# Patient Record
Sex: FEMALE | Race: WHITE | Employment: UNEMPLOYED | URBAN - METROPOLITAN AREA
[De-identification: names, ages, dates, MRNs, and addresses within clinical notes are randomized per-mention and may not be internally consistent; named-entity substitution may affect disease eponyms.]

---

## 2021-11-03 ENCOUNTER — OFFICE VISIT (OUTPATIENT)
Dept: FAMILY MEDICINE CLINIC | Facility: CLINIC | Age: 41
End: 2021-11-03
Payer: COMMERCIAL

## 2021-11-03 ENCOUNTER — LAB ENCOUNTER (OUTPATIENT)
Dept: LAB | Age: 41
End: 2021-11-03
Attending: FAMILY MEDICINE
Payer: COMMERCIAL

## 2021-11-03 VITALS
DIASTOLIC BLOOD PRESSURE: 72 MMHG | OXYGEN SATURATION: 97 % | TEMPERATURE: 98 F | HEART RATE: 72 BPM | RESPIRATION RATE: 16 BRPM | BODY MASS INDEX: 28.28 KG/M2 | WEIGHT: 176 LBS | SYSTOLIC BLOOD PRESSURE: 122 MMHG | HEIGHT: 66 IN

## 2021-11-03 DIAGNOSIS — Z00.00 LABORATORY EXAM ORDERED AS PART OF ROUTINE GENERAL MEDICAL EXAMINATION: ICD-10-CM

## 2021-11-03 DIAGNOSIS — Z23 NEED FOR VACCINATION: ICD-10-CM

## 2021-11-03 DIAGNOSIS — N64.4 BREAST PAIN IN FEMALE: ICD-10-CM

## 2021-11-03 DIAGNOSIS — Z12.4 CERVICAL CANCER SCREENING: ICD-10-CM

## 2021-11-03 DIAGNOSIS — Z80.3 FAMILY HISTORY OF BREAST CANCER: ICD-10-CM

## 2021-11-03 DIAGNOSIS — N64.52 DISCHARGE FROM LEFT NIPPLE: Primary | ICD-10-CM

## 2021-11-03 PROBLEM — F41.9 ANXIETY: Status: ACTIVE | Noted: 2018-03-27

## 2021-11-03 PROBLEM — G25.81 RESTLESS LEG SYNDROME: Status: ACTIVE | Noted: 2018-03-27

## 2021-11-03 PROBLEM — G89.29 CHRONIC BILATERAL LOW BACK PAIN WITH BILATERAL SCIATICA: Status: ACTIVE | Noted: 2018-06-28

## 2021-11-03 PROBLEM — N87.0 CIN I (CERVICAL INTRAEPITHELIAL NEOPLASIA I): Status: ACTIVE | Noted: 2017-05-30

## 2021-11-03 PROBLEM — M54.42 CHRONIC BILATERAL LOW BACK PAIN WITH BILATERAL SCIATICA: Status: ACTIVE | Noted: 2018-06-28

## 2021-11-03 PROBLEM — M54.41 CHRONIC BILATERAL LOW BACK PAIN WITH BILATERAL SCIATICA: Status: ACTIVE | Noted: 2018-06-28

## 2021-11-03 PROBLEM — F17.200 NICOTINE DEPENDENCE: Status: ACTIVE | Noted: 2018-03-27

## 2021-11-03 PROCEDURE — 90686 IIV4 VACC NO PRSV 0.5 ML IM: CPT | Performed by: FAMILY MEDICINE

## 2021-11-03 PROCEDURE — 3008F BODY MASS INDEX DOCD: CPT | Performed by: FAMILY MEDICINE

## 2021-11-03 PROCEDURE — 99204 OFFICE O/P NEW MOD 45 MIN: CPT | Performed by: FAMILY MEDICINE

## 2021-11-03 PROCEDURE — 3078F DIAST BP <80 MM HG: CPT | Performed by: FAMILY MEDICINE

## 2021-11-03 PROCEDURE — 90471 IMMUNIZATION ADMIN: CPT | Performed by: FAMILY MEDICINE

## 2021-11-03 PROCEDURE — 3074F SYST BP LT 130 MM HG: CPT | Performed by: FAMILY MEDICINE

## 2021-11-03 RX ORDER — MULTIVITAMIN
1 TABLET ORAL DAILY
COMMUNITY

## 2021-11-03 NOTE — PROGRESS NOTES
Patient presents with:  New Patient: wants to discuss her hx of HPV, nipple discharge & left low back pain. Had mammo & MRI of both breast done 9/15/20, results available in Care Everywhere.   Immunization/Injection: Influenza vaccine today      H[facility]:  [facility]    • Restless leg syndrome 3/27/2018     Past Surgical History:   Procedure Laterality Date   • BREAST BIOPSY  2020    benign cyst per pt   •   2018    [facility] [place] [facility]   • COLPOSCOPY,BX CERVIX/ENDOCERV CURR  2017 : Not on file  Social Connections:       Frequency of Communication with Friends and Family: Not on file      Frequency of Social Gatherings with Friends and Family: Not on file      Attends Restoration Services: Not on file      Active Member of [employer] or Or clear to auscultation bilaterally. No wheezes, rales, or rhonchi. Normal respiratory effort. CARDIOVASCULAR:  Regular rate and rhythm. No murmurs, gallops, or rubs. ABDOMEN:  + bowel sounds, soft, nontender, nondistended. No hepatomegaly.   Contreras Chin

## 2021-11-05 ENCOUNTER — HOSPITAL ENCOUNTER (OUTPATIENT)
Dept: MAMMOGRAPHY | Age: 41
Discharge: HOME | End: 2021-11-05
Attending: FAMILY MEDICINE
Payer: COMMERCIAL

## 2021-11-05 DIAGNOSIS — N64.52 DISCHARGE FROM LEFT NIPPLE: ICD-10-CM

## 2021-11-05 DIAGNOSIS — N64.4 BREAST PAIN IN FEMALE: ICD-10-CM

## 2021-11-05 PROCEDURE — 77066 DX MAMMO INCL CAD BI: CPT | Performed by: FAMILY MEDICINE

## 2021-11-05 PROCEDURE — 77062 BREAST TOMOSYNTHESIS BI: CPT | Performed by: FAMILY MEDICINE

## 2021-11-15 ENCOUNTER — TELEPHONE (OUTPATIENT)
Dept: FAMILY MEDICINE CLINIC | Facility: CLINIC | Age: 41
End: 2021-11-15

## 2021-11-15 NOTE — TELEPHONE ENCOUNTER
Pt states that she has requested her records from [place] to be sent to us. she wonders if we have received them? Imaging done in hospital outpatient building, The radiologist wanted to see the previous scans to what was just done.  She wonders if theres

## 2021-11-19 ENCOUNTER — OFFICE VISIT (OUTPATIENT)
Dept: FAMILY MEDICINE CLINIC | Facility: CLINIC | Age: 41
End: 2021-11-19
Payer: COMMERCIAL

## 2021-11-19 VITALS
DIASTOLIC BLOOD PRESSURE: 72 MMHG | WEIGHT: 173 LBS | BODY MASS INDEX: 27.8 KG/M2 | HEIGHT: 66 IN | HEART RATE: 68 BPM | TEMPERATURE: 98 F | SYSTOLIC BLOOD PRESSURE: 120 MMHG | OXYGEN SATURATION: 97 % | RESPIRATION RATE: 16 BRPM

## 2021-11-19 DIAGNOSIS — Z87.11 HISTORY OF PEPTIC ULCER DISEASE: ICD-10-CM

## 2021-11-19 DIAGNOSIS — R07.89 CHEST DISCOMFORT: ICD-10-CM

## 2021-11-19 DIAGNOSIS — R19.7 DIARRHEA, UNSPECIFIED TYPE: Primary | ICD-10-CM

## 2021-11-19 DIAGNOSIS — R11.0 NAUSEA: ICD-10-CM

## 2021-11-19 DIAGNOSIS — N64.52 DISCHARGE FROM LEFT NIPPLE: ICD-10-CM

## 2021-11-19 DIAGNOSIS — E78.5 DYSLIPIDEMIA: ICD-10-CM

## 2021-11-19 PROCEDURE — 3008F BODY MASS INDEX DOCD: CPT | Performed by: FAMILY MEDICINE

## 2021-11-19 PROCEDURE — 3078F DIAST BP <80 MM HG: CPT | Performed by: FAMILY MEDICINE

## 2021-11-19 PROCEDURE — 3074F SYST BP LT 130 MM HG: CPT | Performed by: FAMILY MEDICINE

## 2021-11-19 PROCEDURE — 99214 OFFICE O/P EST MOD 30 MIN: CPT | Performed by: FAMILY MEDICINE

## 2021-11-19 RX ORDER — DICYCLOMINE HYDROCHLORIDE 10 MG/1
10 CAPSULE ORAL
Qty: 60 CAPSULE | Refills: 0 | Status: SHIPPED | OUTPATIENT
Start: 2021-11-19

## 2021-11-19 NOTE — PROGRESS NOTES
[facility] Visit Note  11/19/2021      Subjective:      Patient ID: Jules Perkins is a 46year old female. Chief Complaint:  Patient presents with:   Follow - Up: 2 week f/u on back pain  Lab Results: done 11/3/21      HPI:  Bebeto Durham sleeping      Denies- n/v, melena, hematochezia      Nausea all the time/diarrhea- stooling issues going on x 1 yr. Gets diarrhea 4AM that wakes her up with cramping and then is up every hour until 7:00AM. Is tired of waking up feeling sick all the time.  Winnie Herman Dyslipidemia  -work on diet/exercise. Return in about 2 weeks (around 12/3/2021) for f/u chest discomfort and GI concerns.

## 2021-11-22 ENCOUNTER — EKG ENCOUNTER (OUTPATIENT)
Dept: LAB | Age: 41
End: 2021-11-22
Attending: FAMILY MEDICINE
Payer: COMMERCIAL

## 2021-11-22 DIAGNOSIS — R07.89 CHEST DISCOMFORT: ICD-10-CM

## 2021-11-22 PROCEDURE — 93005 ELECTROCARDIOGRAM TRACING: CPT

## 2021-11-22 PROCEDURE — 93010 ELECTROCARDIOGRAM REPORT: CPT | Performed by: INTERNAL MEDICINE

## 2021-11-23 ENCOUNTER — PATIENT MESSAGE (OUTPATIENT)
Dept: FAMILY MEDICINE CLINIC | Facility: CLINIC | Age: 41
End: 2021-11-23

## 2021-11-23 NOTE — TELEPHONE ENCOUNTER
From: Servando Dozier  To: Darrius Izaguirre MD  Sent: 11/23/2021 12:05 PM CST  Subject: Ekg and Mammogram     Adrienne:  I had me EKG test done yesterday and she stated that she would sent the results to you. Could you please contact me with those.

## 2021-11-24 ENCOUNTER — PATIENT MESSAGE (OUTPATIENT)
Dept: FAMILY MEDICINE CLINIC | Facility: CLINIC | Age: 41
End: 2021-11-24

## 2021-11-24 NOTE — TELEPHONE ENCOUNTER
From: Servando Dozier  To: Darrius Izaguirre MD  Sent: 11/24/2021 10:11 AM CST  Subject: Mammogram     Could someone please have my doctor call me with my test results from my Mammogram on November 5th.  I would like to know what the radiologist is

## 2021-12-08 ENCOUNTER — HOSPITAL ENCOUNTER (OUTPATIENT)
Dept: ULTRASOUND IMAGING | Age: 41
Discharge: HOME | End: 2021-12-08
Attending: FAMILY MEDICINE
Payer: COMMERCIAL

## 2021-12-08 ENCOUNTER — HOSPITAL ENCOUNTER (OUTPATIENT)
Dept: MAMMOGRAPHY | Age: 41
Discharge: HOME | End: 2021-12-08
Attending: FAMILY MEDICINE
Payer: COMMERCIAL

## 2021-12-08 DIAGNOSIS — N64.52 DISCHARGE FROM LEFT NIPPLE: ICD-10-CM

## 2021-12-08 DIAGNOSIS — N64.4 BREAST PAIN IN FEMALE: ICD-10-CM

## 2021-12-08 DIAGNOSIS — R92.2 INCONCLUSIVE MAMMOGRAM: ICD-10-CM

## 2021-12-08 PROCEDURE — 76642 ULTRASOUND BREAST LIMITED: CPT | Performed by: FAMILY MEDICINE

## 2021-12-08 PROCEDURE — 77061 BREAST TOMOSYNTHESIS UNI: CPT | Performed by: FAMILY MEDICINE

## 2021-12-08 PROCEDURE — 77065 DX MAMMO INCL CAD UNI: CPT | Performed by: FAMILY MEDICINE

## 2021-12-08 PROCEDURE — 76641 ULTRASOUND BREAST COMPLETE: CPT | Performed by: FAMILY MEDICINE

## 2021-12-08 NOTE — IMAGING NOTE
Ileana Day is recommended for an ultrasound guided biopsy of the right breast x 1 site and left breast x 1 site by [facility].      History obtained:  INDICATIONS: Inconclusive mammogram.  The patient reports a history of left milky and clear nipple di cm.       At 11:30 3 cm from the nipple there is a probably benign complicated cyst measuring 0.3 x 0.2 x 0.2 cm.       There is no right axillary lymphadenopathy.       Left breast ultrasound:   Targeted left breast ultrasound was performed by a trained s 0   • FLUoxetine 20 MG Oral Cap Take 1 capsule by mouth daily. Authorized by: Kadi Jose     • Daily Multiple Vitamins Oral Tab Take 1 tablet by mouth daily. Procedure explained and questions answered.    Ileana Day  provided with written

## 2021-12-22 ENCOUNTER — TELEPHONE (OUTPATIENT)
Dept: FAMILY MEDICINE CLINIC | Facility: CLINIC | Age: 41
End: 2021-12-22

## 2021-12-22 NOTE — TELEPHONE ENCOUNTER
We cannot do a video visit for UTI, she will need to go to [facility], but she wait if no symptoms

## 2021-12-22 NOTE — TELEPHONE ENCOUNTER
Pt scheduled an appt via [employer] for video visit for UTI symptoms. I sent message to the patient to see if she can come into the office. She responded  \"If she doesn't think it is an emergency and can wait until after the weekend holidays?  If this is po

## 2021-12-23 ENCOUNTER — HOSPITAL ENCOUNTER (OUTPATIENT)
Dept: MAMMOGRAPHY | Facility: HOSPITAL | Age: 41
Discharge: HOME | End: 2021-12-23
Attending: FAMILY MEDICINE
Payer: COMMERCIAL

## 2021-12-23 DIAGNOSIS — N63.0 BREAST NODULE: ICD-10-CM

## 2021-12-23 PROCEDURE — 19083 BX BREAST 1ST LESION US IMAG: CPT | Performed by: FAMILY MEDICINE

## 2021-12-23 PROCEDURE — 77066 DX MAMMO INCL CAD BI: CPT | Performed by: FAMILY MEDICINE

## 2021-12-23 PROCEDURE — 19084 BX BREAST ADD LESION US IMAG: CPT | Performed by: FAMILY MEDICINE

## 2021-12-23 PROCEDURE — 88305 TISSUE EXAM BY PATHOLOGIST: CPT | Performed by: FAMILY MEDICINE

## 2021-12-28 ENCOUNTER — TELEPHONE (OUTPATIENT)
Dept: MAMMOGRAPHY | Facility: HOSPITAL | Age: 41
End: 2021-12-28

## 2021-12-28 NOTE — TELEPHONE ENCOUNTER
Telephoned Ileana Rachel and name,  verified with patient.  Notified Ileana Rachel of right breast 1 site negative for malignancy, left breast 1 site negative for malignancy and left breast 1 site negative for malignancy but positive fo

## 2021-12-29 DIAGNOSIS — R92.1 BREAST CALCIFICATIONS: Primary | ICD-10-CM

## 2021-12-30 ENCOUNTER — TELEPHONE (OUTPATIENT)
Dept: FAMILY MEDICINE CLINIC | Facility: CLINIC | Age: 41
End: 2021-12-30

## 2021-12-30 DIAGNOSIS — R35.0 URINARY FREQUENCY: Primary | ICD-10-CM

## 2021-12-30 RX ORDER — SULFAMETHOXAZOLE AND TRIMETHOPRIM 800; 160 MG/1; MG/1
1 TABLET ORAL 2 TIMES DAILY
Qty: 6 TABLET | Refills: 0 | Status: SHIPPED | OUTPATIENT
Start: 2021-12-30 | End: 2022-01-02

## 2021-12-30 NOTE — TELEPHONE ENCOUNTER
To get UA was ordered through [facility]. And after urine sample given may take bactrim.  If sxs don't resolve to be seen

## 2021-12-30 NOTE — TELEPHONE ENCOUNTER
Spoke to patient. Has had urinary frequency for past week. Does not feel like she is emptying fully. Denies burning, pain, blood in urine, odor, fever, back pain, abdominal distention. Did an at home test from [facility] last week- was positive for UTI.

## 2021-12-30 NOTE — TELEPHONE ENCOUNTER
Pt had an appt scheduled for 1-6-22 for UTI and to discuss test results. Pt scheduled this appt on 12-22-21 and still is having UTI symptoms. Pt appt was changed to video due to Covid virtual days, and pt will keep this for the test results.  Please advise

## 2022-01-06 ENCOUNTER — TELEMEDICINE (OUTPATIENT)
Dept: FAMILY MEDICINE CLINIC | Facility: CLINIC | Age: 42
End: 2022-01-06
Payer: COMMERCIAL

## 2022-01-06 DIAGNOSIS — U07.1 COVID-19 VIRUS INFECTION: Primary | ICD-10-CM

## 2022-01-06 DIAGNOSIS — R33.9 INCOMPLETE BLADDER EMPTYING: ICD-10-CM

## 2022-01-06 DIAGNOSIS — Z98.890 S/P BREAST BIOPSY, LEFT: ICD-10-CM

## 2022-01-06 PROCEDURE — 99214 OFFICE O/P EST MOD 30 MIN: CPT | Performed by: FAMILY MEDICINE

## 2022-01-06 RX ORDER — SULFAMETHOXAZOLE AND TRIMETHOPRIM 800; 160 MG/1; MG/1
1 TABLET ORAL 2 TIMES DAILY
Qty: 6 TABLET | Refills: 0 | Status: SHIPPED | OUTPATIENT
Start: 2022-01-06 | End: 2022-01-09

## 2022-01-06 RX ORDER — TRAMADOL HYDROCHLORIDE 50 MG/1
50 TABLET ORAL 2 TIMES DAILY PRN
Qty: 20 TABLET | Refills: 0 | Status: SHIPPED | OUTPATIENT
Start: 2022-01-06

## 2022-01-06 NOTE — PATIENT INSTRUCTIONS
Coronavirus Disease 2019 (COVID-19): Caring for Yourself or Others   If you or a household member have symptoms of COVID-19, follow these guidelines for preventing spread of the virus and managing symptoms.  This is regardless of your vaccination status contact with. · Follow all instructions the healthcare staff give you. If you have been diagnosed with COVID-19  · Stay home and start self-isolation. Don’t leave your home unless you need to get medical care. Don't go to work, school, or public areas. pregnant or breastfeeding people to be vaccinated. Talk with your healthcare provider about which vaccine is best for you and your family. Treatment  Current treatment is mainly aimed at helping your body while it fights the virus.  This is known as suppo risk for severe COVID-19 and a hospital stay. This includes people who are 65 years and older and people with certain chronic conditions.  Monoclonal antibody therapy is not approved for people who:   · Are in the hospital with COVID-19, or  · Need oxygen t when all 3 of these are true:   1. You have had no fever for at least 24 hours. This means no fever without medicine that reduces fever, such as acetaminophen, for at least 24 hours. 2. Your symptoms such as cough or trouble breathing have improved.   3. I time. This leads to variants that are easily spread, including the delta variant.  To protect against variants, CDC advises all people over age 3, including those who are fully vaccinated, to wear a mask indoors in public settings if you are in an area of h Let's learn how to clean your home and lower your risk of infection. To watch the video:  Scan the QR code  Using your mobile device, scan the following code:       OR  Go to the website:  Octav[employer]bler. com  Enter the prescription code:   56L     © 2020

## 2022-01-06 NOTE — PROGRESS NOTES
Video Visit- Sandeep   This is a telemedicine visit with live, interactive video and audio.      Ileana Donahue understands and accepts financial responsibility for any deductible, co-insurance and/or CURR  05/31/2017    mild dysplasi/DILIP I, repeat pap in 1 yr later normal.   • CYST ASPIRATION RIGHT     • REMOVAL OF OVARIAN CYST(S)  2013    [facility]   • TUBAL LIGATION Bilateral 11/05/2018   • US FNA BX BREAST Bilateral 12/23/2021    9:00 B, 1:00 L- advised          Return for if symptoms worsen/don't improve. Maria T Hdz MD      Please note that the following visit was completed using two-way, real-time interactive audio and visual communication.  This has been done in good hilary to provi

## 2022-01-07 ENCOUNTER — TELEPHONE (OUTPATIENT)
Dept: FAMILY MEDICINE CLINIC | Facility: CLINIC | Age: 42
End: 2022-01-07

## 2022-01-07 NOTE — TELEPHONE ENCOUNTER
Patient statess her symptoms have worsened, may have a sinus infection, URI & orange phleghm, please advise.

## 2022-01-07 NOTE — TELEPHONE ENCOUNTER
Spoke to pt, she had a Telemedicine visit yesterday with Dr. Anabela Lucas and states her symptoms have worsened  +cough  +runny nose  +nasal congestion  Denies SOB, chest pain  Pt states she is taking Tylenol over the counter  Advised pt that she can take OTC M

## 2022-02-10 DIAGNOSIS — Z98.890 S/P BREAST BIOPSY, LEFT: ICD-10-CM

## 2022-02-10 RX ORDER — TRAMADOL HYDROCHLORIDE 50 MG/1
50 TABLET ORAL 2 TIMES DAILY PRN
Qty: 20 TABLET | Refills: 0 | OUTPATIENT
Start: 2022-02-10

## 2022-02-10 NOTE — TELEPHONE ENCOUNTER
Pt had a breast biopsy on 12/23/21 and is asking for more pain meds. It should be feeling much better by now. If not, she needs to f/u with the physician who did her biopsy.

## 2022-02-14 ENCOUNTER — PATIENT MESSAGE (OUTPATIENT)
Dept: FAMILY MEDICINE CLINIC | Facility: CLINIC | Age: 42
End: 2022-02-14

## 2022-02-14 RX ORDER — TRAMADOL HYDROCHLORIDE 50 MG/1
50 TABLET ORAL 2 TIMES DAILY PRN
Qty: 20 TABLET | Refills: 0 | Status: CANCELLED | OUTPATIENT
Start: 2022-02-14

## 2022-02-14 NOTE — TELEPHONE ENCOUNTER
From: Norval Spatz  To: Jayna Lofton MD  Sent: 2/14/2022 10:52 AM CST  Subject: Refstevenson Deleon:  I have moved up my Consultation for my breast up to March 8th because of being in so much pain still. I have placed a request to have my Tramadol to ease the pain until surgery time. If you could please send it to the pharmacy it would be appreciated.    Thanks,  Ileana Day

## 2022-02-14 NOTE — TELEPHONE ENCOUNTER
Patient does have appt scheduled with Dr. Arleen Paz for 3/8/22. See [facility] from 2/14/22.  Requesting tramadol until she has appt since she still has pain

## 2022-02-15 NOTE — TELEPHONE ENCOUNTER
I apologize, but she will need to request refill from her surgeon. I am sorry she is still in pain, but she needs to be evaluated sooner to see why. To call surgeon and let them know what's going on.

## 2022-03-08 ENCOUNTER — OFFICE VISIT (OUTPATIENT)
Dept: SURGERY | Facility: CLINIC | Age: 42
End: 2022-03-08
Payer: COMMERCIAL

## 2022-03-08 VITALS
RESPIRATION RATE: 16 BRPM | DIASTOLIC BLOOD PRESSURE: 82 MMHG | HEIGHT: 66 IN | BODY MASS INDEX: 27.13 KG/M2 | WEIGHT: 168.81 LBS | SYSTOLIC BLOOD PRESSURE: 121 MMHG | TEMPERATURE: 98 F | HEART RATE: 76 BPM | OXYGEN SATURATION: 97 %

## 2022-03-08 DIAGNOSIS — D24.2 INTRADUCTAL PAPILLOMA OF LEFT BREAST: Primary | ICD-10-CM

## 2022-03-08 PROCEDURE — 99204 OFFICE O/P NEW MOD 45 MIN: CPT | Performed by: SURGERY

## 2022-03-08 PROCEDURE — 3008F BODY MASS INDEX DOCD: CPT | Performed by: SURGERY

## 2022-03-08 PROCEDURE — 3079F DIAST BP 80-89 MM HG: CPT | Performed by: SURGERY

## 2022-03-08 PROCEDURE — 3074F SYST BP LT 130 MM HG: CPT | Performed by: SURGERY

## 2022-03-08 RX ORDER — QUETIAPINE FUMARATE 25 MG/1
TABLET, FILM COATED ORAL DAILY PRN
COMMUNITY
Start: 2022-02-14

## 2022-03-08 RX ORDER — DEXTROAMPHETAMINE/AMPHETAMINE 15 MG
15 CAPSULE, EXT RELEASE 24 HR ORAL DAILY
COMMUNITY
Start: 2022-02-10

## 2022-03-08 NOTE — PATIENT INSTRUCTIONS
Dr. Cecilio Carrillo  Tel: [phone]  Fax: 267 [address]  [phone]     Surgery/Procedure: Left breast wire localized excisional biopsy. 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    _______________________________________________________________________________    1. Someone must accompany you the day of the procedure to drive you home safely, because of anesthesia. 2. You must remove any kind of makeup, nail polish, lotions, powders, creams or deodorant. 3. [place] ONLY: Pre-admission will give instruct you on when to take Gatorade and Tylenol/acetaminophen prior to your surgery, purchase 2 - 12oz bottles of regular Gatorade (NOT RED/SUGAR FREE). Otherwise, you may not eat or drink anything else after 11PM the night before surgery. 4. [place] ONLY: You may not eat or drink anything after midnight the day of your surgery. 5. Wear comfortable clothing that can be easily removed. 6. If you wear dentures, contacts lenses, or any prosthesis, you will be asked to remove them. 7. Do not drink alcohol or smoke 24 hours prior to your procedure. 8. Bring a picture ID and your insurance card. 9. You will be contacted by the hospital for Pre-Admission Covid-19 testing (regardless of vaccination status) to be scheduled as an appointment prior to surgery. They will call closer to the surgery date to set this up, because the earliest this can be done is 72 hours prior to surgery. 10. The Pre-Admission Testing Department will call the day before to confirm your procedure, give you the time you need to arrive by and directions on where to go.  They begin making calls after 2pm, if you are not contacted by 4pm, please call the surgeon's office listed above. 11. Do not take any blood thinners at least one week prior to the procedure/surgery. This includes aspirin, baby aspirin, Ibuprofen products, herbal supplements, diet medications, vitamin E, fish oil and green tea supplements. Please check other supplements for these ingredients. *TYLENOL or acetaminophen is acceptable*  12. If you take Coumadin, Plavix, Xarelto, or Eliquis, please contact your prescribing physician for special instructions on how long to hold. If you take insulin contact your primary care physician for special instructions. 15. Our surgery scheduler, Laura Diez, will be contacting you to discuss surgery dates. If you have any questions related to scheduling your surgery, please reach out to her at [phone].  _____________________________________________________________________  PRE-OPERATIVE TESTING IF INDICATED BELOW  PLEASE COMPLETE ASAP (AT LEAST 7-10 DAYS PRIOR TO SURGERY)  [] CBC [] BMP [] CMP [] EKG    [] PT, PTT, INR [] Cardiac Clearance  [] H&P Medical Clearance [] Chest X-ray     Please call Central Scheduling to schedule an appointment for pre-operative labs/tests @ [phone]    Does the patient have a pacemaker or ICD?      [] Yes   [x] No

## 2022-03-14 ENCOUNTER — TELEPHONE (OUTPATIENT)
Dept: SURGERY | Facility: CLINIC | Age: 42
End: 2022-03-14

## 2022-03-14 NOTE — TELEPHONE ENCOUNTER
Calling pt in regards to scheduling surgery. Informed pt that I have 04/21/2022 available at [facility] with Dr. Lauran Opitz. Pt verbalized understanding and in agreement with date and location. All questions answered. Encouraged pt to call or [employer] message office with any other questions or concerns.

## 2022-03-16 ENCOUNTER — TELEPHONE (OUTPATIENT)
Dept: SURGERY | Facility: CLINIC | Age: 42
End: 2022-03-16

## 2022-04-05 ENCOUNTER — TELEPHONE (OUTPATIENT)
Dept: MAMMOGRAPHY | Facility: HOSPITAL | Age: 42
End: 2022-04-05

## 2022-04-05 NOTE — TELEPHONE ENCOUNTER
Phoned [facility] regarding needle localization process of breast for lumpectomy scheduled for 4-21-22 with Dr. Josep House. Procedure explained and all questions answered. Pt to be transported via W/C through [facility] to [facility] in [place]. Pt verbalized understanding and had no further questions at this time.

## 2022-04-11 ENCOUNTER — TELEPHONE (OUTPATIENT)
Dept: SURGERY | Facility: CLINIC | Age: 42
End: 2022-04-11

## 2022-04-11 NOTE — TELEPHONE ENCOUNTER
Called patient to let her know surgery needs to be rescheduled offered her 04/15/2022 and patient accepted

## 2022-04-12 ENCOUNTER — TELEPHONE (OUTPATIENT)
Dept: GENERAL RADIOLOGY | Facility: HOSPITAL | Age: 42
End: 2022-04-12

## 2022-04-12 NOTE — TELEPHONE ENCOUNTER
0840: Spoke with Ileana Day at this time. Discussed localization procedure to be done in the   [facility] prior to surgery on Friday, April 15. Procedure explained. Ms. Iam Arcelia verbalized understanding. No questions at this time. Ileana Day verbalized gratitude for the call.

## 2022-04-15 ENCOUNTER — HOSPITAL ENCOUNTER (OUTPATIENT)
Facility: HOSPITAL | Age: 42
Setting detail: HOSPITAL OUTPATIENT SURGERY
Discharge: HOME | End: 2022-04-15
Attending: SURGERY | Admitting: SURGERY
Payer: COMMERCIAL

## 2022-04-15 ENCOUNTER — ANESTHESIA (OUTPATIENT)
Dept: SURGERY | Facility: HOSPITAL | Age: 42
End: 2022-04-15
Payer: COMMERCIAL

## 2022-04-15 ENCOUNTER — HOSPITAL ENCOUNTER (OUTPATIENT)
Dept: MAMMOGRAPHY | Facility: HOSPITAL | Age: 42
Discharge: HOME | End: 2022-04-15
Attending: SURGERY
Payer: COMMERCIAL

## 2022-04-15 ENCOUNTER — ANESTHESIA EVENT (OUTPATIENT)
Dept: SURGERY | Facility: HOSPITAL | Age: 42
End: 2022-04-15
Payer: COMMERCIAL

## 2022-04-15 VITALS
RESPIRATION RATE: 14 BRPM | OXYGEN SATURATION: 95 % | DIASTOLIC BLOOD PRESSURE: 75 MMHG | TEMPERATURE: 98 F | SYSTOLIC BLOOD PRESSURE: 111 MMHG | BODY MASS INDEX: 26.82 KG/M2 | HEART RATE: 50 BPM | HEIGHT: 66 IN | WEIGHT: 166.88 LBS

## 2022-04-15 DIAGNOSIS — Z01.812 ENCOUNTER FOR PREOPERATIVE SCREENING LABORATORY TESTING FOR COVID-19 VIRUS: Primary | ICD-10-CM

## 2022-04-15 DIAGNOSIS — Z20.822 ENCOUNTER FOR PREOPERATIVE SCREENING LABORATORY TESTING FOR COVID-19 VIRUS: Primary | ICD-10-CM

## 2022-04-15 DIAGNOSIS — D24.2 INTRADUCTAL PAPILLOMA OF LEFT BREAST: ICD-10-CM

## 2022-04-15 LAB
B-HCG UR QL: NEGATIVE
SARS-COV-2 RNA RESP QL NAA+PROBE: NOT DETECTED

## 2022-04-15 PROCEDURE — 19281 PERQ DEVICE BREAST 1ST IMAG: CPT | Performed by: SURGERY

## 2022-04-15 PROCEDURE — 88305 TISSUE EXAM BY PATHOLOGIST: CPT | Performed by: SURGERY

## 2022-04-15 PROCEDURE — 81025 URINE PREGNANCY TEST: CPT

## 2022-04-15 PROCEDURE — 0HBU0ZX EXCISION OF LEFT BREAST, OPEN APPROACH, DIAGNOSTIC: ICD-10-PCS | Performed by: SURGERY

## 2022-04-15 PROCEDURE — 76098 X-RAY EXAM SURGICAL SPECIMEN: CPT | Performed by: SURGERY

## 2022-04-15 RX ORDER — LIDOCAINE HYDROCHLORIDE 10 MG/ML
INJECTION, SOLUTION EPIDURAL; INFILTRATION; INTRACAUDAL; PERINEURAL AS NEEDED
Status: DISCONTINUED | OUTPATIENT
Start: 2022-04-15 | End: 2022-04-15 | Stop reason: SURG

## 2022-04-15 RX ORDER — NALOXONE HYDROCHLORIDE 0.4 MG/ML
80 INJECTION, SOLUTION INTRAMUSCULAR; INTRAVENOUS; SUBCUTANEOUS AS NEEDED
OUTPATIENT
Start: 2022-04-15 | End: 2022-04-15

## 2022-04-15 RX ORDER — LIDOCAINE HYDROCHLORIDE AND EPINEPHRINE 10; 10 MG/ML; UG/ML
INJECTION, SOLUTION INFILTRATION; PERINEURAL AS NEEDED
Status: DISCONTINUED | OUTPATIENT
Start: 2022-04-15 | End: 2022-04-15 | Stop reason: HOSPADM

## 2022-04-15 RX ORDER — SODIUM CHLORIDE, SODIUM LACTATE, POTASSIUM CHLORIDE, CALCIUM CHLORIDE 600; 310; 30; 20 MG/100ML; MG/100ML; MG/100ML; MG/100ML
INJECTION, SOLUTION INTRAVENOUS CONTINUOUS
Status: DISCONTINUED | OUTPATIENT
Start: 2022-04-15 | End: 2022-04-15

## 2022-04-15 RX ORDER — ACETAMINOPHEN 500 MG
1000 TABLET ORAL ONCE
Status: DISCONTINUED | OUTPATIENT
Start: 2022-04-15 | End: 2022-04-15 | Stop reason: HOSPADM

## 2022-04-15 RX ORDER — KETOROLAC TROMETHAMINE 30 MG/ML
INJECTION, SOLUTION INTRAMUSCULAR; INTRAVENOUS AS NEEDED
Status: DISCONTINUED | OUTPATIENT
Start: 2022-04-15 | End: 2022-04-15 | Stop reason: SURG

## 2022-04-15 RX ORDER — MEPERIDINE HYDROCHLORIDE 25 MG/ML
12.5 INJECTION INTRAMUSCULAR; INTRAVENOUS; SUBCUTANEOUS AS NEEDED
OUTPATIENT
Start: 2022-04-15

## 2022-04-15 RX ORDER — HYDROCODONE BITARTRATE AND ACETAMINOPHEN 5; 325 MG/1; MG/1
1 TABLET ORAL AS NEEDED
OUTPATIENT
Start: 2022-04-15

## 2022-04-15 RX ORDER — DEXAMETHASONE SODIUM PHOSPHATE 4 MG/ML
VIAL (ML) INJECTION AS NEEDED
Status: DISCONTINUED | OUTPATIENT
Start: 2022-04-15 | End: 2022-04-15 | Stop reason: SURG

## 2022-04-15 RX ORDER — DIAZEPAM 5 MG/1
5 TABLET ORAL AS NEEDED
Status: DISCONTINUED | OUTPATIENT
Start: 2022-04-15 | End: 2022-04-15 | Stop reason: HOSPADM

## 2022-04-15 RX ORDER — KETAMINE HYDROCHLORIDE 50 MG/ML
INJECTION, SOLUTION, CONCENTRATE INTRAMUSCULAR; INTRAVENOUS AS NEEDED
Status: DISCONTINUED | OUTPATIENT
Start: 2022-04-15 | End: 2022-04-15 | Stop reason: SURG

## 2022-04-15 RX ORDER — BUPIVACAINE HYDROCHLORIDE 5 MG/ML
INJECTION, SOLUTION EPIDURAL; INTRACAUDAL AS NEEDED
Status: DISCONTINUED | OUTPATIENT
Start: 2022-04-15 | End: 2022-04-15 | Stop reason: HOSPADM

## 2022-04-15 RX ORDER — MIDAZOLAM HYDROCHLORIDE 1 MG/ML
INJECTION INTRAMUSCULAR; INTRAVENOUS AS NEEDED
Status: DISCONTINUED | OUTPATIENT
Start: 2022-04-15 | End: 2022-04-15 | Stop reason: SURG

## 2022-04-15 RX ORDER — GLYCOPYRROLATE 0.2 MG/ML
INJECTION, SOLUTION INTRAMUSCULAR; INTRAVENOUS AS NEEDED
Status: DISCONTINUED | OUTPATIENT
Start: 2022-04-15 | End: 2022-04-15 | Stop reason: SURG

## 2022-04-15 RX ORDER — CEFAZOLIN SODIUM/WATER 2 G/20 ML
2 SYRINGE (ML) INTRAVENOUS ONCE
Status: COMPLETED | OUTPATIENT
Start: 2022-04-15 | End: 2022-04-15

## 2022-04-15 RX ORDER — ONDANSETRON 2 MG/ML
INJECTION INTRAMUSCULAR; INTRAVENOUS AS NEEDED
Status: DISCONTINUED | OUTPATIENT
Start: 2022-04-15 | End: 2022-04-15 | Stop reason: SURG

## 2022-04-15 RX ORDER — ACETAMINOPHEN 500 MG
1000 TABLET ORAL ONCE AS NEEDED
OUTPATIENT
Start: 2022-04-15 | End: 2022-04-15

## 2022-04-15 RX ORDER — SODIUM CHLORIDE, SODIUM LACTATE, POTASSIUM CHLORIDE, CALCIUM CHLORIDE 600; 310; 30; 20 MG/100ML; MG/100ML; MG/100ML; MG/100ML
INJECTION, SOLUTION INTRAVENOUS CONTINUOUS
OUTPATIENT
Start: 2022-04-15

## 2022-04-15 RX ORDER — HYDROCODONE BITARTRATE AND ACETAMINOPHEN 5; 325 MG/1; MG/1
2 TABLET ORAL AS NEEDED
OUTPATIENT
Start: 2022-04-15

## 2022-04-15 RX ORDER — HYDROCODONE BITARTRATE AND ACETAMINOPHEN 5; 325 MG/1; MG/1
1-2 TABLET ORAL EVERY 6 HOURS PRN
Qty: 20 TABLET | Refills: 0 | Status: SHIPPED | OUTPATIENT
Start: 2022-04-15

## 2022-04-15 RX ORDER — DIPHENHYDRAMINE HYDROCHLORIDE 50 MG/ML
12.5 INJECTION INTRAMUSCULAR; INTRAVENOUS AS NEEDED
OUTPATIENT
Start: 2022-04-15 | End: 2022-04-15

## 2022-04-15 RX ORDER — ONDANSETRON 2 MG/ML
4 INJECTION INTRAMUSCULAR; INTRAVENOUS AS NEEDED
OUTPATIENT
Start: 2022-04-15 | End: 2022-04-15

## 2022-04-15 RX ORDER — LIDOCAINE AND PRILOCAINE 25; 25 MG/G; MG/G
CREAM TOPICAL
Status: DISCONTINUED
Start: 2022-04-15 | End: 2022-04-15 | Stop reason: WASHOUT

## 2022-04-15 RX ORDER — HYDROMORPHONE HYDROCHLORIDE 1 MG/ML
0.4 INJECTION, SOLUTION INTRAMUSCULAR; INTRAVENOUS; SUBCUTANEOUS EVERY 5 MIN PRN
OUTPATIENT
Start: 2022-04-15 | End: 2022-04-15

## 2022-04-15 RX ADMIN — KETAMINE HYDROCHLORIDE 15 MG: 50 INJECTION, SOLUTION, CONCENTRATE INTRAMUSCULAR; INTRAVENOUS at 11:44:00

## 2022-04-15 RX ADMIN — CEFAZOLIN SODIUM/WATER 2 G: 2 G/20 ML SYRINGE (ML) INTRAVENOUS at 11:43:00

## 2022-04-15 RX ADMIN — GLYCOPYRROLATE 0.1 MG: 0.2 INJECTION, SOLUTION INTRAMUSCULAR; INTRAVENOUS at 11:44:00

## 2022-04-15 RX ADMIN — SODIUM CHLORIDE, SODIUM LACTATE, POTASSIUM CHLORIDE, CALCIUM CHLORIDE: 600; 310; 30; 20 INJECTION, SOLUTION INTRAVENOUS at 11:37:00

## 2022-04-15 RX ADMIN — DEXAMETHASONE SODIUM PHOSPHATE 4 MG: 4 MG/ML VIAL (ML) INJECTION at 11:43:00

## 2022-04-15 RX ADMIN — ONDANSETRON 4 MG: 2 INJECTION INTRAMUSCULAR; INTRAVENOUS at 12:07:00

## 2022-04-15 RX ADMIN — MIDAZOLAM HYDROCHLORIDE 2 MG: 1 INJECTION INTRAMUSCULAR; INTRAVENOUS at 11:40:00

## 2022-04-15 RX ADMIN — KETOROLAC TROMETHAMINE 30 MG: 30 INJECTION, SOLUTION INTRAMUSCULAR; INTRAVENOUS at 12:08:00

## 2022-04-15 RX ADMIN — LIDOCAINE HYDROCHLORIDE 50 MG: 10 INJECTION, SOLUTION EPIDURAL; INFILTRATION; INTRACAUDAL; PERINEURAL at 11:41:00

## 2022-04-15 NOTE — IMAGING NOTE
Assisted  with mammography guided needle localization of the left breast.   Ileana Day identified with spelling of name and date of birth. Medications and allergies reviewed. The following allergies were reported: Penicillin GNAUSEA AND VOMITING    History:  Intraductal papilloma of left breast  Surgery: Left breast wire localized excisional biopsy    Order verified. Procedure explained and questions answered. Ileana Day verbalized understanding and agreement. 5295: Written consent obtained by imaging staff. 1007: Scans taken by Lorelei Yin- mammography technologist        1009: Site prepped in a sterile manner. 1010: Dr. Yomaira Mittal present    1010: Time out complete. 1011: Lidocaine administered for anesthetic affect. Site marked left breast dumbbell shaped clip biopsy confirmed intraductal papilloma  1011: Landeros 20G x 7.5cm needle placed left breast  Emotional support provided. Ileana Day tolerated procedure well. Site cleaned. Wire secured with blue clip, steri strips, sterile 4x4 gauze dressing, and a Tegaderm, and Transpore tape. 1026: Ileana Day transported via wheelchair to pre-op/surgery holding in stable condition. Ms. Monika Bonilla without complaints or concerns at this time.

## 2022-04-15 NOTE — OPERATIVE REPORT
[facility]    PATIENT'S NAME: James BOSS   ATTENDING PHYSICIAN: Cathleen Mclaughlin. Doreen Carvalho M.D. OPERATING PHYSICIAN: Cathleen Mclaughlin. Doreen Carvalho M.D. PATIENT ACCOUNT#:   [de-identified]    [facility]  MEDICAL RECORD #:   [id number]       YOB: 1980  ADMISSION DATE:       04/15/2022      OPERATION DATE:  04/15/2022    OPERATIVE REPORT      PREOPERATIVE DIAGNOSIS:  Intraductal papilloma of the left breast.  POSTOPERATIVE DIAGNOSIS:  Intraductal papilloma of the left breast.  PROCEDURE:  Left breast wire localized lumpectomy with left breast specimen radiography. ASSISTANT:  Chela Gonzalez CSA. ANESTHESIA:  Monitored anesthesia care and local.    ESTIMATED BLOOD LOSS:  10 mL. DRAINS:  None. COMPLICATIONS:  None. DISPOSITION:  Stable on transfer to recovery room. INDICATIONS:  The patient is a 29-year-old female with an imaging-detected concern of the left breast.  She reports that she has had some nonspontaneous nipple discharge. She was found to have a suspicious finding and had multiple biopsies, one of which showed a papilloma. We discussed this may be the cause of the nipple discharge and it could also be associated with intraductal atypia and/or malignancy and, therefore, surgical excision has been recommended. Risks and possible complications were discussed with the patient including, but not limited to, infection, bleeding, injury to surrounding structures, possible need for reoperation. She agreed to the proposed surgery. OPERATIVE TECHNIQUE:  Patient was brought to the imaging suite. She underwent a wire localization of the area of concern in the left breast.  She was brought to the OR, placed in supine position, properly padded and secured, given a dose of IV antibiotics. Sequential compression devices were applied to her legs for DVT prophylaxis. Monitored anesthesia care was induced.   Her left breast was prepped and draped in the usual sterile fashion. Lidocaine 1% with epinephrine was used to infiltrate the skin and subcutaneous tissue at the targeted incision site. A curvilinear incision was made along the medial areolar border with a 15 blade knife in the skin. The wire was identified, brought into the field. Using sharp dissection and electrocautery, a segment of breast tissue surrounding the tip of the wire was carefully excised, oriented with a short single clip superiorly, long stitch double laterally, placed in the imaging device where specimen x-ray confirmed the presence of targeted clip, residual area with adequate margins as deemed by myself. A clip was then placed back within the cavity to assist with subsequent surveillance. The wound was irrigated and hemostasis assured with electrocautery. It was closed with interrupted 3-0 Vicryl for deep layer, running 4-0 subcuticular Monocryl for skin. Mastisol and Steri-Strips were applied. Marcaine 0.5% was instilled in the cavity to assist with postoperative analgesia. A sterile dressing and compression bra were placed. Blood loss was minimal.  All counts were correct at the conclusion of the procedure. It should be noted that the pathology was carefully oriented with sutures and clip markings in order to allow for appropriate pathologic margin assessment. Patient tolerated the procedure well. She was transferred to the recovery area in stable condition. Dictated By Shailesh Reyes M.D.  d: 04/15/2022 12:27:03  t: 04/15/2022 17:51:06  Job [id number]  CMG/    cc: Daphney Carrizales MD

## 2022-04-15 NOTE — BRIEF OP NOTE
Pre-Operative Diagnosis: Intraductal papilloma of left breast [D24.2]     Post-Operative Diagnosis: * No post-op diagnosis entered *      Procedure Performed:      Left breast wire localized excisional biopsy    Surgeon(s) and Role:     Haritha Escalona MD - Primary    Assistant(s):        Surgical Findings: Clip in xray     Specimen: L lumpectomy     Estimated Blood Loss: 10cc      Amanda Amaro MD  4/15/2022  11:38 AM

## 2022-04-19 PROBLEM — D24.2 INTRADUCTAL PAPILLOMA OF LEFT BREAST: Status: ACTIVE | Noted: 2022-04-15

## 2022-04-29 ENCOUNTER — OFFICE VISIT (OUTPATIENT)
Dept: SURGERY | Facility: CLINIC | Age: 42
End: 2022-04-29
Payer: COMMERCIAL

## 2022-04-29 VITALS
TEMPERATURE: 98 F | OXYGEN SATURATION: 97 % | HEART RATE: 66 BPM | HEIGHT: 66 IN | BODY MASS INDEX: 26.23 KG/M2 | DIASTOLIC BLOOD PRESSURE: 88 MMHG | SYSTOLIC BLOOD PRESSURE: 139 MMHG | WEIGHT: 163.19 LBS | RESPIRATION RATE: 16 BRPM

## 2022-04-29 DIAGNOSIS — D24.2 PAPILLOMA OF LEFT BREAST: Primary | ICD-10-CM

## 2022-04-29 PROCEDURE — 3008F BODY MASS INDEX DOCD: CPT | Performed by: SURGERY

## 2022-04-29 PROCEDURE — 3079F DIAST BP 80-89 MM HG: CPT | Performed by: SURGERY

## 2022-04-29 PROCEDURE — 99024 POSTOP FOLLOW-UP VISIT: CPT | Performed by: SURGERY

## 2022-04-29 PROCEDURE — 3075F SYST BP GE 130 - 139MM HG: CPT | Performed by: SURGERY

## 2022-05-02 DIAGNOSIS — D24.2 INTRADUCTAL PAPILLOMA OF LEFT BREAST: ICD-10-CM

## 2022-05-20 RX ORDER — HYDROCODONE BITARTRATE AND ACETAMINOPHEN 5; 325 MG/1; MG/1
1-2 TABLET ORAL EVERY 6 HOURS PRN
Qty: 20 TABLET | Refills: 0 | OUTPATIENT
Start: 2022-05-20

## 2023-03-22 NOTE — TELEPHONE ENCOUNTER
Called patient to offer sooner OR date on 03/17/2022. Pt declined sooner date due to patient moving. Will keep original OR date 04/21/2022. OMERO

## (undated) DIAGNOSIS — Z98.890 S/P BREAST BIOPSY, LEFT: ICD-10-CM

## (undated) DIAGNOSIS — D24.2 INTRADUCTAL PAPILLOMA OF LEFT BREAST: Primary | ICD-10-CM

## (undated) DEVICE — GOWN,SIRUS,FABRIC-REINFORCED,LARGE: Brand: MEDLINE

## (undated) DEVICE — SOL  .9 1000ML BTL

## (undated) DEVICE — SUTURE MONOCRYL 4-0 PS-2

## (undated) DEVICE — BREAST-HERNIA-PORT CDS-LF: Brand: MEDLINE INDUSTRIES, INC.

## (undated) DEVICE — DRAPE PACK CHEST & U BAR

## (undated) DEVICE — UNDERPAD 23X36 LIGHT ASBORB

## (undated) DEVICE — SCD SLEEVE KNEE HI BLEND

## (undated) DEVICE — SUTURE VICRYL 3-0 SH

## (undated) DEVICE — HEMOCLIP HORIZON MED 002200

## (undated) DEVICE — Device

## (undated) DEVICE — SUTURE SILK 2-0 FS

## (undated) DEVICE — DRAPE,TAPE STRIPS,STERILE: Brand: MEDLINE

## (undated) DEVICE — TOWEL SURG OR 17X30IN BLUE

## (undated) DEVICE — STERILE POLYISOPRENE POWDER-FREE SURGICAL GLOVES: Brand: PROTEXIS

## (undated) DEVICE — CLEAR MONOFILAMENT (POLYDIOXANONE), ABSORBABLE SURGICAL SUTURE: Brand: PDS

## (undated) DEVICE — HEMOCLIP HORIZON SM 001200

## (undated) DEVICE — ABDOMINAL PAD: Brand: DERMACEA

## (undated) DEVICE — 3M™ STERI-DRAPE™ INSTRUMENT POUCH 1018: Brand: STERI-DRAPE™

## (undated) DEVICE — MEGADYNE E-Z CLEAN BLADE 2.75"